# Patient Record
Sex: MALE | Race: WHITE
[De-identification: names, ages, dates, MRNs, and addresses within clinical notes are randomized per-mention and may not be internally consistent; named-entity substitution may affect disease eponyms.]

---

## 2020-01-17 ENCOUNTER — HOSPITAL ENCOUNTER (OUTPATIENT)
Dept: HOSPITAL 97 - OR | Age: 40
Discharge: HOME | End: 2020-01-17
Attending: OTOLARYNGOLOGY
Payer: COMMERCIAL

## 2020-01-17 VITALS — DIASTOLIC BLOOD PRESSURE: 94 MMHG | OXYGEN SATURATION: 96 % | SYSTOLIC BLOOD PRESSURE: 137 MMHG | TEMPERATURE: 96.8 F

## 2020-01-17 DIAGNOSIS — D17.0: Primary | ICD-10-CM

## 2020-01-17 PROCEDURE — 21552 EXC NECK LES SC 3 CM/>: CPT

## 2020-01-17 PROCEDURE — 88304 TISSUE EXAM BY PATHOLOGIST: CPT

## 2020-01-17 PROCEDURE — 93005 ELECTROCARDIOGRAM TRACING: CPT

## 2020-01-17 PROCEDURE — 88305 TISSUE EXAM BY PATHOLOGIST: CPT

## 2020-01-17 PROCEDURE — 0JB40ZZ EXCISION OF RIGHT NECK SUBCUTANEOUS TISSUE AND FASCIA, OPEN APPROACH: ICD-10-PCS

## 2020-01-17 NOTE — OP
Date of Procedure:  01/17/2020



Surgeon:  Angy Gan MD



Assistant:  Maksim Briceno.



Postoperative Diagnosis:  Right neck lipoma.



Postoperative Diagnosis:  Right neck lipoma.



Procedure:  Modified radical neck dissection with excision of 12 cm lipoma from 
the deep neck space.



Indication For Procedure:  Mr. Pelaez presented with a neck mass approximately 5 
years ago.  He underwent a CT scan of the neck, which demonstrated a fatty mass 
of the right deep neck space deep to the sternocleidomastoid muscle and 
extending superiorly and into the tail of the parotid.  The mass at that time 
was 8 x 3 cm and the patient opted for observation.  Over the last 5 years, he 
has had significant growth of the mass until where it is causing pressure and 
discomfort and increasing concerns for obstructive sleep apnea symptoms and he 
elects for excision.  The risks, benefits, and alternatives to this surgery are 
discussed with the patient and his wife and they agreed to proceed.



Description In Detail:  The patient was brought to the operating room and 
placed in a supine position.  He was placed under general anesthesia via oral 
endotracheal tube and was intubated without difficulty.  A shoulder roll was 
placed.  The neck was extended with support of the head and the face was turned 
towards the left for exposure of the right neck.  The beard of the face and 
neck was trimmed with clippers.  The planned incision site was injected with 0.5
% Marcaine and the neck was prepped with Betadine and draped in a sterile 
fashion.  A vertical incision along the anterior border of the 
sternocleidomastoid muscle extending from the angle of the mandible to the mid 
lower neck was made through the skin and subcutaneous tissues.  The platysma 
muscle was identified and divided and the subplatysmal flap was elevated.  The 
yellow subcutaneous fat was identified and carefully dissected until the 
capsule of the lipoma was identified on the anterior and inferior aspect of the 
lipoma.  Blunt dissection along the capsule was taken out using a peanut 
dissector and a Pérez with ligation of small blood vessels using the LigaSure.
  The mass was rotated laterally and the Ansa cervicalis nerve was identified 
and pushed away from the tumor.  The inferior aspect of the tumor was dissected 
free from the underlying soft tissues.  The anterior border of the 
sternocleidomastoid muscle was then identified and carefully dissected.  The 
sternocleidomastoid muscle was then retracted and the posterior aspect of the 
lipomatous tumor was dissected bluntly with Ligasure for cautery of small 
vessels.  The internal jugular vein was then identified in the inferior and 
deep aspect of the surgical bed and the lipoma was carefully dissected away 
from this structure.  As dissection carries on from inferior to superior 
direction, the spinal accessory nerve was identified at its insertion point in 
the sternocleidomastoid muscle.  The lipomatosis tumor was then carefully 
dissected away from this nerve preserving its physical integrity.  The 
attention was then turned to the superior aspect of the mass.  The lipomatous 
tumor was dissected away from the skull base region using the LigaSure to 
divide small blood vessels.  The tumor capsule most superiorly, was slightly 
violated during this dissection, but the bulk of the tumor seems to pull easily 
away from surrounding structures.  In the superior anterior aspect, the 
digastric muscle was identified and the tumor was dissected away from this 
digastric muscle.  The final soft tissue attachments were then divided and the 
tumor was set aside.  The surgical bed was thoroughly irrigated and examined.  
The spinal accessory nerve appears to be intact without any evidence of 
physical disruption.  The internal jugular and common carotid artery were 
visualized and appear to be without injury.  The Ansa cervicalis was likewise 
visualized and appears to be normal.  Careful palpation of the neck shows 
multiple areas of fatty deposits, but the large lipomatous tumor appears to be 
completely removed.  A 10-[place] round YOVANA drain was then placed within the 
surgical bed and withdrawn through the posterior skin flap just adjacent to the 
external jugular vein.  The drain was secured using a 4-0 nylon suture.  The 
incision was then closed in a layered fashion using 4-0 Vicryl, deep sutures to 
approximate the platysma and a running 5-0 fast-absorbing gut for skin closure.
  The wound was dressed with triple antibiotic ointment and the patient was 
returned to care of anesthesia for awakening extubation which proceeds without 
difficulty.  

There was significant clinical concern for obstructive sleep apnea noted during 
emergence with snoring and obstruction due to the base of tongue requiring a 
jaw thrust.  I will likely discuss these findings with the patient and 
recommend a postoperative sleep study and likely application of continuous 
positive airway pressure during sleep.



Disposition:  The patient will be discharged home later today in the care of 
his wife and follow up with Dr. Gan on Monday for removal of the YOVANA drain.





MANJINDER/MODL

DD:  01/17/2020 11:54:38   Voice ID:  [id number]

DT:  01/17/2020 19:12:12   Report ID:  [id number]

MTDD

## 2020-01-17 NOTE — EKG
Test Date:    2020-01-16               Test Time:    11:27:42

Technician:   ISRAEL                                   

                                                     

MEASUREMENT RESULTS:                                       

Intervals:                                           

Rate:         60                                     

FL:           160                                    

QRSD:         88                                     

QT:           400                                    

QTc:          400                                    

Axis:                                                

P:            49                                     

FL:           160                                    

QRS:          51                                     

T:            28                                     

                                                     

INTERPRETIVE STATEMENTS:                                       

                                                     

Normal sinus rhythm with sinus arrhythmia

Normal ECG

No previous ECG available for comparison



Electronically Signed On 01-17-20 08:07:38 CST by Chris Mckeon

## 2020-01-17 NOTE — P.BOP
Preoperative diagnosis: right neck lipoma


Postoperative diagnosis: same


Primary procedure: right MRND


Assistant: Maksim Burton


Estimated blood loss: <10ml


Specimen: R neck mass


Findings: consistant with 12cm lipoma


Anesthesia: General


Complications: None


Drain(s): YOVANA drain


Transferred to: Recovery Room


Condition: Good